# Patient Record
Sex: FEMALE | Race: WHITE
[De-identification: names, ages, dates, MRNs, and addresses within clinical notes are randomized per-mention and may not be internally consistent; named-entity substitution may affect disease eponyms.]

---

## 2019-10-02 ENCOUNTER — HOSPITAL ENCOUNTER (EMERGENCY)
Dept: HOSPITAL 92 - SCSER | Age: 39
LOS: 1 days | Discharge: HOME | End: 2019-10-03
Payer: COMMERCIAL

## 2019-10-02 DIAGNOSIS — F17.210: ICD-10-CM

## 2019-10-02 DIAGNOSIS — I82.811: Primary | ICD-10-CM

## 2019-10-02 DIAGNOSIS — G43.909: ICD-10-CM

## 2019-10-02 PROCEDURE — 71275 CT ANGIOGRAPHY CHEST: CPT

## 2019-10-02 PROCEDURE — 85730 THROMBOPLASTIN TIME PARTIAL: CPT

## 2019-10-02 PROCEDURE — 80053 COMPREHEN METABOLIC PANEL: CPT

## 2019-10-02 PROCEDURE — 85610 PROTHROMBIN TIME: CPT

## 2019-10-02 PROCEDURE — 85025 COMPLETE CBC W/AUTO DIFF WBC: CPT
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
## 2019-10-02 NOTE — ULT
ULTRASOUND DOPPLER DUPLEX VENOUS RIGHT LOWER EXTREMITY:



DATE:

10/2/2019



HISTORY:

39-year-old female with right leg pain.



TECHNIQUE:

Grayscale, color-flow, and spectral analysis, of the right common femoral, profunda femoral, greater 
saphenous, femoral, popliteal, and posterior tibial, veins.



FINDINGS:

There is noncompressibility and decreased blood flow in the proximal portion of the posterior tibial 
vein and the upper calf corresponding to the region of pain. There is blood flow in the mid and

distal portions of posterior tibial vein. There is soft tissue edema at the ankle. There is no thromb
osis of the femoral vein, popliteal vein, profunda femoral vein, or common femoral vein.



IMPRESSION:

Positive for thrombosis of the proximal aspect of the posterior tibial vein in the right calf.





Reported By: Melchor Borrero 

Electronically Signed:  10/2/2019 11:04 PM

## 2019-10-03 LAB
ALBUMIN SERPL BCG-MCNC: 3.6 G/DL (ref 3.5–5)
ALP SERPL-CCNC: 58 U/L (ref 40–110)
ALT SERPL W P-5'-P-CCNC: 8 U/L (ref 8–55)
ANION GAP SERPL CALC-SCNC: 14 MMOL/L (ref 10–20)
APTT PPP: 26.6 SEC (ref 22.9–36.1)
AST SERPL-CCNC: 11 U/L (ref 5–34)
BASOPHILS # BLD AUTO: 0.1 THOU/UL (ref 0–0.2)
BASOPHILS NFR BLD AUTO: 0.6 % (ref 0–1)
BILIRUB SERPL-MCNC: 0.2 MG/DL (ref 0.2–1.2)
BUN SERPL-MCNC: 8 MG/DL (ref 7–18.7)
CALCIUM SERPL-MCNC: 8.5 MG/DL (ref 7.8–10.44)
CHLORIDE SERPL-SCNC: 105 MMOL/L (ref 98–107)
CO2 SERPL-SCNC: 22 MMOL/L (ref 22–29)
CREAT CL PREDICTED SERPL C-G-VRATE: 0 ML/MIN (ref 70–130)
EOSINOPHIL # BLD AUTO: 0 THOU/UL (ref 0–0.7)
EOSINOPHIL NFR BLD AUTO: 0.3 % (ref 0–10)
GLOBULIN SER CALC-MCNC: 2.7 G/DL (ref 2.4–3.5)
GLUCOSE SERPL-MCNC: 73 MG/DL (ref 70–105)
HGB BLD-MCNC: 12.9 G/DL (ref 12–16)
INR PPP: 1
LYMPHOCYTES # BLD: 2.4 THOU/UL (ref 1.2–3.4)
LYMPHOCYTES NFR BLD AUTO: 28.5 % (ref 21–51)
MCH RBC QN AUTO: 32.1 PG (ref 27–31)
MCV RBC AUTO: 95.6 FL (ref 78–98)
MONOCYTES # BLD AUTO: 0.8 THOU/UL (ref 0.11–0.59)
MONOCYTES NFR BLD AUTO: 9.6 % (ref 0–10)
NEUTROPHILS # BLD AUTO: 5.1 THOU/UL (ref 1.4–6.5)
NEUTROPHILS NFR BLD AUTO: 60.9 % (ref 42–75)
PLATELET # BLD AUTO: 217 THOU/UL (ref 130–400)
POTASSIUM SERPL-SCNC: 3.8 MMOL/L (ref 3.5–5.1)
PROTHROMBIN TIME: 13.6 SEC (ref 12–14.7)
RBC # BLD AUTO: 4.04 MILL/UL (ref 4.2–5.4)
SODIUM SERPL-SCNC: 137 MMOL/L (ref 136–145)
WBC # BLD AUTO: 8.4 THOU/UL (ref 4.8–10.8)

## 2019-10-03 NOTE — CT
PRELIMINARY REPORT/[facility]/EMERGENCY AFTER

HOURS PROCEDURE: 

 

PROCEDURE INFORMATION:

Exam: CT Angiography Chest With Contrast

 

Exam date and time: 10/2/2019 11:45 PM

 

Clinical history: 39 years old, female; Other: Dvt; Patient HX: Calf thrombus and SOB

 

TECHNIQUE:

Imaging protocol: Computed tomographic angiography of the chest with intravenous contrast.

3D rendering: MIP reconstructed images were created and reviewed.

Contrast material: [id number]; Contrast volume: 85 ml; Contrast route: IV;

 

COMPARISON:

No relevant prior studies available.

 

FINDINGS:

Pulmonary arteries: Normal. No pulmonary emboli.

Aorta: Unremarkable. No aortic aneurysm. No aortic dissection.

Other veins: Small amount of gas in the right internal jugular vein likely the result of recent IV.

Lungs: Unremarkable. No consolidation. No masses.

Pleural space: Unremarkable. No pneumothorax. No pleural effusion.

Heart: Unremarkable. No cardiomegaly. No pericardial effusion.

Lymph nodes: Unremarkable. No enlarged lymph nodes.

Bones/joints: Unremarkable. No acute fracture.

Soft tissues: The soft tissues of the axilla, neck and chest wall are unremarkable.

 

IMPRESSION:

No evidence of pulmonary embolus. No evidence of acute pulmonary pathology.

 

 

Thank you for allowing us to participate in the care of your patient.

Dictated and Authenticated by: Beau Ching MD

10/03/2019 12:57 AM Central Time ([place] & [place])

 

 

 

FINAL REPORT 

 

EMERGENCY AFTER HOURS CTA CHEST WITH CONTRAST:

 

Date:  10/02/19 

 

FINDINGS/IMPRESSION: 

I agree with the findings and impression given in the preliminary report per [facility] physician. No evide
nce of pulmonary thromboembolism.

## 2019-12-10 ENCOUNTER — HOSPITAL ENCOUNTER (OUTPATIENT)
Dept: HOSPITAL 92 - BICMAMMO | Age: 39
Discharge: HOME | End: 2019-12-10
Payer: COMMERCIAL

## 2019-12-10 DIAGNOSIS — Z13.820: ICD-10-CM

## 2019-12-10 DIAGNOSIS — Z79.899: ICD-10-CM

## 2019-12-10 DIAGNOSIS — Z12.31: Primary | ICD-10-CM

## 2019-12-10 DIAGNOSIS — Z80.3: ICD-10-CM

## 2019-12-10 DIAGNOSIS — Z01.419: ICD-10-CM

## 2019-12-10 PROCEDURE — 77063 BREAST TOMOSYNTHESIS BI: CPT

## 2019-12-10 PROCEDURE — 77067 SCR MAMMO BI INCL CAD: CPT

## 2019-12-10 PROCEDURE — 77080 DXA BONE DENSITY AXIAL: CPT

## 2019-12-10 NOTE — MMO
Bilateral MAMMO Bilat Screen [facility].

 

CLINICAL HISTORY:

Patient is 39 years old and is seen for screening. The patient has the following

family history of breast cancer:  maternal grandmother, malignant (generic) and

2 maternal aunts, malignant (generic).  The patient has no personal history of

cancer.

 

VIEWS:

The views performed were:  bilateral craniocaudal with tomosynthesis and

bilateral mediolateral oblique with tomosynthesis.

 

FILMS COMPARED:

The present examination has been compared to prior imaging studies performed at

[facility] on 05/04/2011, and at [facility] on 11/07/2018.

 

This study has been interpreted with the assistance of computer-aided detection.

 

MAMMOGRAM FINDINGS:

The breasts are heterogeneously dense, which could obscure a lesion on

mammography.

 

There are no suspicious masses, suspicious calcifications, or new areas of

architectural distortion.

 

IMPRESSION:

THERE IS NO MAMMOGRAPHIC EVIDENCE OF MALIGNANCY.

 

A ROUTINE FOLLOW-UP MAMMOGRAM IN 1 YEAR IS RECOMMENDED.

 

THE RESULTS OF THIS EXAM WERE SENT TO THE PATIENT.

 

ACR BI-RADS Category 1 - Negative

 

MAMMOGRAPHY NOTE:

 1. A negative mammogram report should not delay a biopsy if a dominant of

 clinically suspicious mass is present.

 2. Approximately 10% to 15% of breast cancers are not detected by

 mammography.

 3. Adenosis and dense breasts may obscure an underlying neoplasm.

 

 

Reported by: CUBA WILLIAMSON MD

Electonically Signed: [id number]

## 2019-12-10 NOTE — BD
Exam:  DEXA Bone Density

12/10/19

 

HISTORY: 

39-year-old female with osteoporosis. Patient on long term drug therapy. 

 

FINDINGS: 

Lumbar Spine:       BMD (g/cm2)      T-SCORE      

    L1              0.902            -0.8                

    L2              0.987            -0.4                

    L3              1.008            -0.7                

    L4              1.072            0.1                

 

    L1-L4           0.996           -0.5

 

Left Femoral Neck:  0.793            -0.5                

 

Total Proximal left Femur: 0.928      -0.1                       

 

Impression:

Normal bone mineral density. 

 

POS: OFF

## 2020-04-16 ENCOUNTER — HOSPITAL ENCOUNTER (OUTPATIENT)
Dept: HOSPITAL 92 - SCSULT | Age: 40
Discharge: HOME | End: 2020-04-16
Attending: NEUROLOGICAL SURGERY
Payer: COMMERCIAL

## 2020-04-16 DIAGNOSIS — M79.89: ICD-10-CM

## 2020-04-16 DIAGNOSIS — M79.604: Primary | ICD-10-CM

## 2020-04-16 DIAGNOSIS — Z86.718: ICD-10-CM

## 2020-04-16 NOTE — ULT
VENOUS DOPPLER ULTRASOUND OF THE RIGHT LOWER EXTREMITY:

 

HISTORY: 

DVT in the posterior tibial vein in the right calf on 10/2/2019.  Right lower extremity pain.

 

TECHNIQUE: 

Gray scale ultrasound with color flow and spectral Doppler imaging of the deep venous system of the r
ight lower extremity was performed.

 

FINDINGS: 

Comparison is made to the exam of 10/2/2019.

 

There is good flow, compression, and augmentation noted in the common femoral, femoral, deep femoral,
 popliteal, posterior tibial, and greater saphenous veins.

 

IMPRESSION: 

No evidence of deep vein thrombosis in the right lower extremity.

 

POS: [facility]

## 2020-04-19 ENCOUNTER — HOSPITAL ENCOUNTER (EMERGENCY)
Dept: HOSPITAL 18 - NAV ERS | Age: 40
Discharge: HOME | End: 2020-04-19
Payer: COMMERCIAL

## 2020-04-19 DIAGNOSIS — S20.212A: ICD-10-CM

## 2020-04-19 DIAGNOSIS — G89.29: ICD-10-CM

## 2020-04-19 DIAGNOSIS — S20.211A: ICD-10-CM
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
## 2020-04-19 DIAGNOSIS — F17.210: ICD-10-CM

## 2020-04-19 DIAGNOSIS — M79.661: ICD-10-CM

## 2020-04-19 DIAGNOSIS — S00.83XA: Primary | ICD-10-CM

## 2020-04-19 LAB
ALBUMIN SERPL BCG-MCNC: 4.1 G/DL (ref 3.5–5)
ALP SERPL-CCNC: 56 U/L (ref 40–110)
ALT SERPL W P-5'-P-CCNC: 27 U/L (ref 8–55)
ANION GAP SERPL CALC-SCNC: 13 MMOL/L (ref 10–20)
APTT PPP: 19.6 SEC (ref 22.9–36.1)
AST SERPL-CCNC: 27 U/L (ref 5–34)
BASOPHILS # BLD AUTO: 0.1 THOU/UL (ref 0–0.2)
BASOPHILS NFR BLD AUTO: 0.6 % (ref 0–1)
BILIRUB SERPL-MCNC: 0.3 MG/DL (ref 0.2–1.2)
BUN SERPL-MCNC: 10 MG/DL (ref 7–18.7)
CALCIUM SERPL-MCNC: 9 MG/DL (ref 7.8–10.44)
CHLORIDE SERPL-SCNC: 106 MMOL/L (ref 98–107)
CO2 SERPL-SCNC: 26 MMOL/L (ref 22–29)
CREAT CL PREDICTED SERPL C-G-VRATE: 0 ML/MIN (ref 70–130)
EOSINOPHIL # BLD AUTO: 0 THOU/UL (ref 0–0.7)
EOSINOPHIL NFR BLD AUTO: 0 % (ref 0–10)
GLOBULIN SER CALC-MCNC: 2.8 G/DL (ref 2.4–3.5)
GLUCOSE SERPL-MCNC: 115 MG/DL (ref 70–105)
HGB BLD-MCNC: 14.8 G/DL (ref 12–16)
INR PPP: 1
LIPASE SERPL-CCNC: 11 U/L (ref 8–78)
LYMPHOCYTES # BLD AUTO: 1 THOU/UL (ref 1.2–3.4)
LYMPHOCYTES NFR BLD AUTO: 8.4 % (ref 21–51)
MCH RBC QN AUTO: 31.4 PG (ref 27–31)
MCV RBC AUTO: 97.9 FL (ref 78–98)
MONOCYTES # BLD AUTO: 0.8 THOU/UL (ref 0.11–0.59)
MONOCYTES NFR BLD AUTO: 7 % (ref 0–10)
NEUTROPHILS # BLD AUTO: 9.9 THOU/UL (ref 1.4–6.5)
NEUTROPHILS NFR BLD AUTO: 84.1 % (ref 42–75)
PLATELET # BLD AUTO: 253 THOU/UL (ref 130–400)
POTASSIUM SERPL-SCNC: 4 MMOL/L (ref 3.5–5.1)
PROTHROMBIN TIME: 13.1 SEC (ref 12–14.7)
RBC # BLD AUTO: 4.72 MILL/UL (ref 4.2–5.4)
SODIUM SERPL-SCNC: 141 MMOL/L (ref 136–145)
WBC # BLD AUTO: 11.8 THOU/UL (ref 4.8–10.8)

## 2020-04-19 PROCEDURE — 85025 COMPLETE CBC W/AUTO DIFF WBC: CPT

## 2020-04-19 PROCEDURE — 70450 CT HEAD/BRAIN W/O DYE: CPT

## 2020-04-19 PROCEDURE — 94760 N-INVAS EAR/PLS OXIMETRY 1: CPT

## 2020-04-19 PROCEDURE — 81003 URINALYSIS AUTO W/O SCOPE: CPT

## 2020-04-19 PROCEDURE — 70486 CT MAXILLOFACIAL W/O DYE: CPT

## 2020-04-19 PROCEDURE — 93005 ELECTROCARDIOGRAM TRACING: CPT

## 2020-04-19 PROCEDURE — 83690 ASSAY OF LIPASE: CPT

## 2020-04-19 PROCEDURE — 80307 DRUG TEST PRSMV CHEM ANLYZR: CPT

## 2020-04-19 PROCEDURE — 84484 ASSAY OF TROPONIN QUANT: CPT

## 2020-04-19 PROCEDURE — 85610 PROTHROMBIN TIME: CPT

## 2020-04-19 PROCEDURE — 85730 THROMBOPLASTIN TIME PARTIAL: CPT

## 2020-04-19 PROCEDURE — 71260 CT THORAX DX C+: CPT

## 2020-04-19 PROCEDURE — 80053 COMPREHEN METABOLIC PANEL: CPT
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
## 2020-05-14 ENCOUNTER — HOSPITAL ENCOUNTER (OUTPATIENT)
Dept: HOSPITAL 92 - SCSMRI | Age: 40
Discharge: HOME | End: 2020-05-14
Attending: NEUROLOGICAL SURGERY
Payer: COMMERCIAL

## 2020-05-14 DIAGNOSIS — M48.061: ICD-10-CM

## 2020-05-14 DIAGNOSIS — M51.16: Primary | ICD-10-CM

## 2020-05-14 DIAGNOSIS — M51.9: ICD-10-CM

## 2020-05-14 DIAGNOSIS — R60.9: ICD-10-CM

## 2020-05-14 DIAGNOSIS — M48.07: ICD-10-CM

## 2020-05-14 PROCEDURE — 72100 X-RAY EXAM L-S SPINE 2/3 VWS: CPT

## 2020-05-14 PROCEDURE — 72148 MRI LUMBAR SPINE W/O DYE: CPT
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
## 2020-05-14 NOTE — RAD
LUMBAR SPINE:

5/14/20

 

There is two views. Lateral view is taken with flexion and extension.

 

HISTORY: 

Lumbar radiculopathy. 

 

FINDINGS/IMPRESSION: 

Lumbar vertebrae maintain height and alignment. There is a slight posterolisthesis at L3-4 which appe
ars to exacerbate slightly with extension. No other listhesis identified. 

 

 

 

POS: AGW

## 2020-06-12 ENCOUNTER — HOSPITAL ENCOUNTER (OUTPATIENT)
Dept: HOSPITAL 92 - SCSMRI | Age: 40
Discharge: HOME | End: 2020-06-12
Attending: NEUROLOGICAL SURGERY
Payer: COMMERCIAL

## 2020-06-12 DIAGNOSIS — M50.222: ICD-10-CM

## 2020-06-12 DIAGNOSIS — M48.02: Primary | ICD-10-CM

## 2020-06-12 PROCEDURE — 72040 X-RAY EXAM NECK SPINE 2-3 VW: CPT

## 2020-06-12 PROCEDURE — 72141 MRI NECK SPINE W/O DYE: CPT
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
## 2023-02-08 ENCOUNTER — HOSPITAL ENCOUNTER (OUTPATIENT)
Dept: HOSPITAL 92 - CSHMAMMO | Age: 43
Discharge: HOME | End: 2023-02-08
Attending: NURSE PRACTITIONER
Payer: COMMERCIAL

## 2023-02-08 DIAGNOSIS — Z80.3: ICD-10-CM

## 2023-02-08 DIAGNOSIS — Z12.31: Primary | ICD-10-CM

## 2023-02-08 PROCEDURE — 77063 BREAST TOMOSYNTHESIS BI: CPT

## 2023-02-08 PROCEDURE — 77067 SCR MAMMO BI INCL CAD: CPT
